# Patient Record
Sex: FEMALE | URBAN - METROPOLITAN AREA
[De-identification: names, ages, dates, MRNs, and addresses within clinical notes are randomized per-mention and may not be internally consistent; named-entity substitution may affect disease eponyms.]

---

## 2020-03-15 ENCOUNTER — VIRTUAL VISIT (OUTPATIENT)
Dept: FAMILY MEDICINE | Facility: OTHER | Age: 35
End: 2020-03-15

## 2020-03-18 NOTE — PROGRESS NOTES
"Date: 03/15/2020 05:29:14  Clinician: Samantha Paul  Clinician NPI: [id number]  Patient: Leslie Jean  Patient : 1985  Patient Address: [address] [place] [address]  Patient Phone: [phone]  Visit Protocol: URI  Patient Summary:  Leslie is a 34 year old ( : 1985 ) female who initiated a Visit for COVID-19 (Coronavirus) evaluation and screening. When asked the question \"Please sign me up to receive news, health information and promotions. \", Leslie responded \"No\".    Leslie states her symptoms started gradually 3-6 days ago.   Her symptoms consist of malaise, rhinitis, myalgia, chills, wheezing, a sore throat, and nasal congestion. She is experiencing difficulty breathing due to nasal congestion but she is not short of breath. Leslie also feels feverish.   Symptom details     Nasal secretions: The color of her mucus is clear.    Sore throat: Leslie reports having mild throat pain (1-3 on a 10 point pain scale), does not have exudate on her tonsils, and can swallow liquids. The lymph nodes in her neck are not enlarged. A rash has not appeared on the skin since the sore throat started.     Temperature: Her current temperature is 99.5 degrees Fahrenheit.     Wheezing: Leslie has not ever been diagnosed with asthma. The wheezing does not interfere with her normal daily activities.     Leslie denies having ear pain, enlarged lymph nodes, facial pain or pressure, cough, teeth pain, and headache. She also denies double sickening (worsening symptoms after initial improvement), taking antibiotic medication for the symptoms, and having recent facial or sinus surgery in the past 60 days.   Precipitating events  Leslie is not sure if she has been exposed to someone with strep throat. She has not recently been exposed to someone with influenza. Leslie has not been in close contact with any high risk individuals.   Pertinent COVID-19 (Coronavirus) information  Leslie has not traveled " internationally or to the areas where COVID-19 (Coronavirus) is widespread in the last 14 days before the start of her symptoms.   Leslie has not had close contact with a suspected or laboratory-confirmed COVID-19 patient within 14 days of symptom onset.   Leslie is not a healthcare worker and does not work in a healthcare facility.   Pertinent medical history  Leslie does not get yeast infections when she takes antibiotics.   Leslie does not need a return to work/school note.   Weight: 135 lbs   Leslie does not smoke or use smokeless tobacco.   She denies pregnancy and denies breastfeeding. She has menstruated in the past month.   Weight: 135 lbs    MEDICATIONS: Zoloft oral, ALLERGIES: NKDA  Clinician Response:  Dear Leslie,    Diagnosis: Cough  Diagnosis ICD: R05